# Patient Record
Sex: FEMALE | Race: BLACK OR AFRICAN AMERICAN | Employment: FULL TIME | URBAN - METROPOLITAN AREA
[De-identification: names, ages, dates, MRNs, and addresses within clinical notes are randomized per-mention and may not be internally consistent; named-entity substitution may affect disease eponyms.]

---

## 2022-07-18 ENCOUNTER — HOSPITAL ENCOUNTER (OUTPATIENT)
Dept: MAMMOGRAPHY | Age: 52
Discharge: HOME | End: 2022-07-18
Payer: COMMERCIAL

## 2022-07-18 VITALS — WEIGHT: 199 LBS | HEIGHT: 62 IN | BODY MASS INDEX: 36.62 KG/M2

## 2022-07-18 DIAGNOSIS — Z12.31 VISIT FOR SCREENING MAMMOGRAM: ICD-10-CM

## 2022-07-18 PROCEDURE — 77063 BREAST TOMOSYNTHESIS BI: CPT

## 2024-11-02 ENCOUNTER — HOSPITAL ENCOUNTER (OUTPATIENT)
Dept: MAMMOGRAPHY | Age: 54
Discharge: HOME | End: 2024-11-02
Payer: COMMERCIAL

## 2024-11-02 DIAGNOSIS — Z12.31 VISIT FOR SCREENING MAMMOGRAM: ICD-10-CM

## 2024-11-02 PROCEDURE — 77063 BREAST TOMOSYNTHESIS BI: CPT

## 2025-01-24 ENCOUNTER — OFFICE VISIT (OUTPATIENT)
Age: 55
End: 2025-01-24

## 2025-01-24 VITALS
OXYGEN SATURATION: 97 % | BODY MASS INDEX: 37.46 KG/M2 | HEART RATE: 97 BPM | DIASTOLIC BLOOD PRESSURE: 82 MMHG | WEIGHT: 219.4 LBS | HEIGHT: 64 IN | TEMPERATURE: 98.7 F | SYSTOLIC BLOOD PRESSURE: 153 MMHG

## 2025-01-24 DIAGNOSIS — B37.2 INTERTRIGINOUS CANDIDIASIS: Primary | ICD-10-CM

## 2025-01-24 RX ORDER — ERGOCALCIFEROL 1.25 MG/1
50000 CAPSULE, LIQUID FILLED ORAL
COMMUNITY
Start: 2024-10-25

## 2025-01-24 RX ORDER — FLUCONAZOLE 100 MG/1
100 TABLET ORAL DAILY
Qty: 14 TABLET | Refills: 0 | Status: SHIPPED | OUTPATIENT
Start: 2025-01-24 | End: 2025-02-07

## 2025-01-24 RX ORDER — NYSTATIN 100000 U/G
CREAM TOPICAL
Qty: 30 G | Refills: 1 | Status: SHIPPED | OUTPATIENT
Start: 2025-01-24

## 2025-01-24 NOTE — PROGRESS NOTES
Kiera Bowman (: 1970) is a 54 y.o. female, New patient, here for evaluation of the following chief complaint(s):  Rash (Painful rash under bilateral breast x 1 week )      ASSESSMENT/PLAN:    ICD-10-CM    1. Intertriginous candidiasis  B37.2 nystatin (MYCOSTATIN) 496133 UNIT/GM cream     fluconazole (DIFLUCAN) 100 MG tablet            Discussed PCP follow up for persisting or worsening symptoms, or to return to the clinic if unable to obtain PCP follow up for worsening symptoms.    The patient tolerated their visit well. The patient and/or the family were informed of the results of any tests, a time was given to answer questions, a plan was proposed and they agreed with plan. Reviewed AVS with treatment instructions and answered questions - pt/family expresses understanding and agreement with the discussed treatment plan and AVS instructions.      SUBJECTIVE/OBJECTIVE:    Rash        VITAL SIGNS  Vitals:    25 0848   BP: (!) 153/82  Comment: has not taken BP meds today   Site: Right Upper Arm   Position: Sitting   Cuff Size: Large Adult   Pulse: 97   Temp: 98.7 °F (37.1 °C)   TempSrc: Oral   SpO2: 97%   Weight: 99.5 kg (219 lb 6.4 oz)   Height: 1.626 m (5' 4\")       Review of Systems   Skin:  Positive for rash.     See HPI for pertinent positives and negatives.    Physical Exam  Exam conducted with a chaperone present (Ms. Whitney Higginbotham).   Skin:     Comments: In the presence of a female chaperone Ms. Olson VIRGINIA Higginbotham.  Examination of both breast show evidence of erythema and raised papular rash consistent with Candida intertrigo.        PROCEDURES:  Unless otherwise noted below, none     Procedures    RESULTS:  No results found for this visit on 25.  An electronic signature was used to authenticate this note.    --Fareed Corral Jr., MD

## 2025-01-24 NOTE — PATIENT INSTRUCTIONS
Kiera,    Thank you for trusting [facility] with your health care needs. Your decision to come to us means a lot, and we are honored to be part of your healthcare journey.  At [facility], our dedicated team is committed to providing you with the highest quality of care in a warm and welcoming environment. Your health and well-being are our top priorities, and we appreciate the opportunity to serve you.    Thank you for choosing us, and we’re here for you whenever you need us!    Warm regards,       [facility]    [] Dr. Corral [] VIRGINIA Hannah, Supervisor       [] THEA Dai    [] RT Lucia    [] VIRGINIA Harden    [] VIRGINIA Lemus  [] VIRGINIA Anaya   [] VIRGINIA Olson    [] VIRGINIA Loya

## 2025-02-26 ENCOUNTER — OFFICE VISIT (OUTPATIENT)
Dept: PRIMARY CARE CLINIC | Age: 55
End: 2025-02-26
Payer: COMMERCIAL

## 2025-02-26 VITALS
OXYGEN SATURATION: 97 % | HEIGHT: 62 IN | HEART RATE: 81 BPM | TEMPERATURE: 97.5 F | BODY MASS INDEX: 40.67 KG/M2 | DIASTOLIC BLOOD PRESSURE: 106 MMHG | SYSTOLIC BLOOD PRESSURE: 157 MMHG | WEIGHT: 221 LBS

## 2025-02-26 DIAGNOSIS — E55.9 VITAMIN D DEFICIENCY: ICD-10-CM

## 2025-02-26 DIAGNOSIS — I10 HYPERTENSION, UNSPECIFIED TYPE: Primary | ICD-10-CM

## 2025-02-26 DIAGNOSIS — R73.03 PREDIABETES: ICD-10-CM

## 2025-02-26 LAB
25(OH)D3 SERPL-MCNC: 17.3 NG/ML
VIT B12 SERPL-MCNC: 283 PG/ML (ref 211–911)

## 2025-02-26 PROCEDURE — 36415 COLL VENOUS BLD VENIPUNCTURE: CPT | Performed by: STUDENT IN AN ORGANIZED HEALTH CARE EDUCATION/TRAINING PROGRAM

## 2025-02-26 PROCEDURE — 3077F SYST BP >= 140 MM HG: CPT | Performed by: STUDENT IN AN ORGANIZED HEALTH CARE EDUCATION/TRAINING PROGRAM

## 2025-02-26 PROCEDURE — 3080F DIAST BP >= 90 MM HG: CPT | Performed by: STUDENT IN AN ORGANIZED HEALTH CARE EDUCATION/TRAINING PROGRAM

## 2025-02-26 PROCEDURE — 99204 OFFICE O/P NEW MOD 45 MIN: CPT | Performed by: STUDENT IN AN ORGANIZED HEALTH CARE EDUCATION/TRAINING PROGRAM

## 2025-02-26 RX ORDER — OXYCODONE AND ACETAMINOPHEN 5; 325 MG/1; MG/1
TABLET ORAL
COMMUNITY
Start: 2025-02-12

## 2025-02-26 RX ORDER — SIMVASTATIN 20 MG
20 TABLET ORAL NIGHTLY
Qty: 90 TABLET | Refills: 1 | Status: SHIPPED | OUTPATIENT
Start: 2025-02-26

## 2025-02-26 SDOH — HEALTH STABILITY: PHYSICAL HEALTH: ON AVERAGE, HOW MANY DAYS PER WEEK DO YOU ENGAGE IN MODERATE TO STRENUOUS EXERCISE (LIKE A BRISK WALK)?: 1 DAY

## 2025-02-26 SDOH — ECONOMIC STABILITY: FOOD INSECURITY: WITHIN THE PAST 12 MONTHS, THE FOOD YOU BOUGHT JUST DIDN'T LAST AND YOU DIDN'T HAVE MONEY TO GET MORE.: NEVER TRUE

## 2025-02-26 SDOH — ECONOMIC STABILITY: FOOD INSECURITY: WITHIN THE PAST 12 MONTHS, YOU WORRIED THAT YOUR FOOD WOULD RUN OUT BEFORE YOU GOT MONEY TO BUY MORE.: NEVER TRUE

## 2025-02-26 SDOH — HEALTH STABILITY: PHYSICAL HEALTH: ON AVERAGE, HOW MANY MINUTES DO YOU ENGAGE IN EXERCISE AT THIS LEVEL?: 30 MIN

## 2025-02-26 ASSESSMENT — PATIENT HEALTH QUESTIONNAIRE - PHQ9
SUM OF ALL RESPONSES TO PHQ QUESTIONS 1-9: 0
1. LITTLE INTEREST OR PLEASURE IN DOING THINGS: NOT AT ALL
SUM OF ALL RESPONSES TO PHQ9 QUESTIONS 1 & 2: 0
7. TROUBLE CONCENTRATING ON THINGS, SUCH AS READING THE NEWSPAPER OR WATCHING TELEVISION: NOT AT ALL
8. MOVING OR SPEAKING SO SLOWLY THAT OTHER PEOPLE COULD HAVE NOTICED. OR THE OPPOSITE, BEING SO FIGETY OR RESTLESS THAT YOU HAVE BEEN MOVING AROUND A LOT MORE THAN USUAL: NOT AT ALL
SUM OF ALL RESPONSES TO PHQ QUESTIONS 1-9: 0
4. FEELING TIRED OR HAVING LITTLE ENERGY: NOT AT ALL
3. TROUBLE FALLING OR STAYING ASLEEP: NOT AT ALL
10. IF YOU CHECKED OFF ANY PROBLEMS, HOW DIFFICULT HAVE THESE PROBLEMS MADE IT FOR YOU TO DO YOUR WORK, TAKE CARE OF THINGS AT HOME, OR GET ALONG WITH OTHER PEOPLE: NOT DIFFICULT AT ALL
5. POOR APPETITE OR OVEREATING: NOT AT ALL
9. THOUGHTS THAT YOU WOULD BE BETTER OFF DEAD, OR OF HURTING YOURSELF: NOT AT ALL
6. FEELING BAD ABOUT YOURSELF - OR THAT YOU ARE A FAILURE OR HAVE LET YOURSELF OR YOUR FAMILY DOWN: NOT AT ALL
SUM OF ALL RESPONSES TO PHQ QUESTIONS 1-9: 0
SUM OF ALL RESPONSES TO PHQ QUESTIONS 1-9: 0
2. FEELING DOWN, DEPRESSED OR HOPELESS: NOT AT ALL

## 2025-02-26 NOTE — ASSESSMENT & PLAN NOTE
Will have patient record her blood pressures for the next couple of weeks on current medication.  If blood pressures remain above goal then we will consider making dose adjustment.  Continue on statin therapy.  ER precautions discussed.    Orders:    simvastatin (ZOCOR) 20 MG tablet; Take 1 tablet by mouth nightly

## 2025-02-26 NOTE — PATIENT INSTRUCTIONS
Blood pressure goal is to be less than 140/90  Keep a log of your blood pressures and bring them to your next appointment  Encourage low-sodium diet as well as decreasing caffeine  Please go to the ER if you have any chest pain, shortness of breath, nausea, vision changes, new onset headache in the setting of your blood pressure to be high

## 2025-02-26 NOTE — PROGRESS NOTES
Office Note  2025  Patient Name: Kiera Bowman  MRN: [id number] : 1970    SUBJECTIVE:     CHIEF COMPLAINT:  Chief Complaint   Patient presents with    New Patient     Here to [facility]       HISTORY OF PRESENT ILLNESS:  She presents today with the following concerns:    HTN:  States that she is on Pinzide 10/12.5 mg daily, compliant  BP at home 110s-120s sys  No red flag symptoms of HTN  Patient states that she usually has higher blood pressures when in the office   Compliant on statin therapy, currently on simvastatin 20 nightly      Past Medical History:  Past Medical History:   Diagnosis Date    Hyperlipidemia     Hypertension     Wears glasses      Past Surgical History:  Past Surgical History:   Procedure Laterality Date    LEEP       Medications:  Current Outpatient Medications   Medication Sig Dispense Refill    oxyCODONE-acetaminophen (PERCOCET) 5-325 MG per tablet       simvastatin (ZOCOR) 20 MG tablet Take 1 tablet by mouth nightly 90 tablet 1    metFORMIN (GLUCOPHAGE) 850 MG tablet Take 1 tablet by mouth daily (with breakfast) 60 tablet 1    vitamin D (ERGOCALCIFEROL) 1.25 MG (61462 UT) CAPS capsule Take 1 capsule by mouth every 7 days      zolpidem (AMBIEN) 10 MG tablet TAKE 1 TABLET BY MOUTH ONCE DAILY AT BEDTIME AS NEEDED FOR SLEEP 30 tablet 0    lisinopril-hydrochlorothiazide (PRINZIDE) 10-12.5 MG per tablet Take 1 tablet by mouth daily. 90 tablet 3    atorvastatin (LIPITOR) 20 MG tablet Take 1 tablet by mouth daily. 30 tablet 5     No current facility-administered medications for this visit.     Allergies:  Allergies   Allergen Reactions    Duloxetine Hcl Other (See Comments)     major depression     Social History:  Social History     Tobacco Use    Smoking status: Never    Smokeless tobacco: Never   Vaping Use    Vaping status: Never Used   Substance Use Topics    Alcohol use: No    Drug use: Never        ROS and PHYSICAL:   ROS:  10 point ROS otherwise negative except as mentioned in

## 2025-02-28 DIAGNOSIS — E55.9 VITAMIN D DEFICIENCY: Primary | ICD-10-CM

## 2025-02-28 RX ORDER — ERGOCALCIFEROL 1.25 MG/1
50000 CAPSULE, LIQUID FILLED ORAL
Qty: 5 CAPSULE | Refills: 4 | Status: SHIPPED | OUTPATIENT
Start: 2025-02-28

## 2025-03-06 ENCOUNTER — TELEPHONE (OUTPATIENT)
Dept: PRIMARY CARE CLINIC | Age: 55
End: 2025-03-06

## 2025-03-06 NOTE — TELEPHONE ENCOUNTER
Was off work this week due to poss cold / flu she is going back to work on Monday and needs a note to return back to work please advise ok to give note

## 2025-03-10 ENCOUNTER — PATIENT MESSAGE (OUTPATIENT)
Dept: PRIMARY CARE CLINIC | Age: 55
End: 2025-03-10

## 2025-03-10 DIAGNOSIS — G89.29 CHRONIC BILATERAL LOW BACK PAIN WITH BILATERAL SCIATICA: Primary | ICD-10-CM

## 2025-03-10 DIAGNOSIS — M54.42 CHRONIC BILATERAL LOW BACK PAIN WITH BILATERAL SCIATICA: Primary | ICD-10-CM

## 2025-03-10 DIAGNOSIS — M54.41 CHRONIC BILATERAL LOW BACK PAIN WITH BILATERAL SCIATICA: Primary | ICD-10-CM

## 2025-03-11 RX ORDER — OXYCODONE AND ACETAMINOPHEN 5; 325 MG/1; MG/1
2 TABLET ORAL EVERY 8 HOURS PRN
Qty: 60 TABLET | Refills: 0 | Status: SHIPPED | OUTPATIENT
Start: 2025-03-11 | End: 2025-03-21

## 2025-03-11 NOTE — TELEPHONE ENCOUNTER
Will send 1 refill of Percocet to pharmacy.  However, further refills need to come from pain management.  Can discuss this at upcoming appointment.

## 2025-03-14 ENCOUNTER — OFFICE VISIT (OUTPATIENT)
Dept: PRIMARY CARE CLINIC | Age: 55
End: 2025-03-14

## 2025-03-14 VITALS
HEART RATE: 83 BPM | SYSTOLIC BLOOD PRESSURE: 139 MMHG | OXYGEN SATURATION: 97 % | HEIGHT: 64 IN | DIASTOLIC BLOOD PRESSURE: 88 MMHG | BODY MASS INDEX: 37.22 KG/M2 | TEMPERATURE: 98.2 F | WEIGHT: 218 LBS

## 2025-03-14 DIAGNOSIS — E55.9 VITAMIN D DEFICIENCY: ICD-10-CM

## 2025-03-14 DIAGNOSIS — I10 HYPERTENSION, UNSPECIFIED TYPE: Primary | ICD-10-CM

## 2025-03-14 DIAGNOSIS — R73.03 PREDIABETES: ICD-10-CM

## 2025-03-14 DIAGNOSIS — Z00.00 ROUTINE ADULT HEALTH MAINTENANCE: ICD-10-CM

## 2025-03-14 RX ORDER — ERGOCALCIFEROL 1.25 MG/1
50000 CAPSULE, LIQUID FILLED ORAL
Qty: 5 CAPSULE | Refills: 4 | Status: SHIPPED | OUTPATIENT
Start: 2025-03-14

## 2025-03-14 NOTE — PROGRESS NOTES
Office Note: HTN  3/14/2025  Patient Name: Kiera Bowman  MRN: [id number] : 1970      Subjective:      Patient here for follow-up of elevated blood pressure.     HTN:  Vitals:    25 1016   BP: 139/88   Pulse: 83   Temp: 98.2 °F (36.8 °C)   SpO2: 97%      Recheck needed today? no  Current BP medications: Pinzide 10/12.5 mg daily,   Compliant? yes  Checking BP at home?  yes     -Readings less than 140/90? yes  Diet/exercise: Improved  Last lipid panel: 2024  Last BMP: 2020  Statin therapy required?  On simvastatin 20  Smoker?  No  New headache sx, chest pain, nausea, leg swelling, blurred vision, dizziness?  No         Patient's medications, allergies, past medical, surgical, social and family histories were reviewed and updated as appropriate.    Review of Systems  ROS negative except mentioned above in HPI.       Objective:   Physical Exam  Vitals and nursing note reviewed.   Constitutional:       General: She is not in acute distress.     Appearance: She is normal weight.   Eyes:      Conjunctiva/sclera: Conjunctivae normal.   Cardiovascular:      Rate and Rhythm: Normal rate and regular rhythm.      Pulses: Normal pulses.      Heart sounds: Normal heart sounds.   Pulmonary:      Effort: Pulmonary effort is normal.      Breath sounds: Normal breath sounds.   Neurological:      General: No focal deficit present.      Mental Status: She is alert.           Assessment/Plan:     1. Hypertension, unspecified type  -Continue current management  -Encouraged to keep blood pressure log and bring to next appointment  -Emphasized healthy lifestyle modifications to help aid in the management of this issue.  -Continue statin therapy as directed    2. Vitamin D deficiency  - vitamin D (ERGOCALCIFEROL) 1.25 MG (94972 UT) CAPS capsule; Take 1 capsule by mouth every 7 days  Dispense: 5 capsule; Refill: 4  - Vitamin D 25 Hydroxy; Future    3. Routine adult health maintenance  - Vitamin B12 &

## 2025-03-18 DIAGNOSIS — F51.01 PRIMARY INSOMNIA: Primary | ICD-10-CM

## 2025-03-18 RX ORDER — ZOLPIDEM TARTRATE 10 MG/1
10 TABLET ORAL NIGHTLY PRN
Qty: 30 TABLET | Refills: 0 | Status: SHIPPED | OUTPATIENT
Start: 2025-03-18 | End: 2025-04-17

## 2025-04-09 DIAGNOSIS — M54.42 CHRONIC BILATERAL LOW BACK PAIN WITH BILATERAL SCIATICA: Primary | ICD-10-CM

## 2025-04-09 DIAGNOSIS — G89.29 CHRONIC BILATERAL LOW BACK PAIN WITH BILATERAL SCIATICA: Primary | ICD-10-CM

## 2025-04-09 DIAGNOSIS — M54.41 CHRONIC BILATERAL LOW BACK PAIN WITH BILATERAL SCIATICA: Primary | ICD-10-CM

## 2025-04-09 RX ORDER — OXYCODONE AND ACETAMINOPHEN 5; 325 MG/1; MG/1
1 TABLET ORAL EVERY 8 HOURS PRN
Qty: 60 TABLET | Refills: 0 | Status: SHIPPED | OUTPATIENT
Start: 2025-04-09 | End: 2025-05-09

## 2025-04-09 NOTE — PROGRESS NOTES
Will place pain management referral as well as place one more refill for percocet as I usually do not prescribe chronic opioids.

## 2025-04-10 NOTE — PROGRESS NOTES
Called pt and informed her that provider sent medications to her pharmacy & left number for pain management on VM

## 2025-04-16 DIAGNOSIS — F51.01 PRIMARY INSOMNIA: ICD-10-CM

## 2025-04-17 RX ORDER — ZOLPIDEM TARTRATE 10 MG/1
10 TABLET ORAL NIGHTLY PRN
Qty: 30 TABLET | Refills: 5 | Status: SHIPPED | OUTPATIENT
Start: 2025-04-17 | End: 2025-10-14

## 2025-04-17 NOTE — TELEPHONE ENCOUNTER
Medication:   Requested Prescriptions     Pending Prescriptions Disp Refills    zolpidem (AMBIEN) 10 MG tablet 30 tablet 0     Sig: Take 1 tablet by mouth nightly as needed for Sleep for up to 30 days. Max Daily Amount: 10 mg        Last Filled:      Patient Phone Number: [phone] (home) [phone] (work)    Last appt: 3/14/2025   Next appt: Visit date not found    Last OARRS:        No data to display